# Patient Record
Sex: MALE | Race: WHITE | NOT HISPANIC OR LATINO | Employment: UNEMPLOYED | URBAN - METROPOLITAN AREA
[De-identification: names, ages, dates, MRNs, and addresses within clinical notes are randomized per-mention and may not be internally consistent; named-entity substitution may affect disease eponyms.]

---

## 2017-01-04 ENCOUNTER — OFFICE VISIT (OUTPATIENT)
Dept: FAMILY MEDICINE CLINIC | Facility: CLINIC | Age: 72
End: 2017-01-04

## 2017-01-04 VITALS
SYSTOLIC BLOOD PRESSURE: 140 MMHG | HEART RATE: 86 BPM | HEIGHT: 73 IN | DIASTOLIC BLOOD PRESSURE: 82 MMHG | OXYGEN SATURATION: 95 % | TEMPERATURE: 98.8 F | BODY MASS INDEX: 33.27 KG/M2 | WEIGHT: 251 LBS

## 2017-01-04 DIAGNOSIS — J06.9 ACUTE URI: Primary | ICD-10-CM

## 2017-01-04 DIAGNOSIS — E11.9 TYPE 2 DIABETES MELLITUS WITHOUT COMPLICATION, WITHOUT LONG-TERM CURRENT USE OF INSULIN (HCC): ICD-10-CM

## 2017-01-04 DIAGNOSIS — I10 ESSENTIAL HYPERTENSION: ICD-10-CM

## 2017-01-04 PROCEDURE — 99213 OFFICE O/P EST LOW 20 MIN: CPT | Performed by: NURSE PRACTITIONER

## 2017-01-04 RX ORDER — AZITHROMYCIN 250 MG/1
TABLET, FILM COATED ORAL
Qty: 6 TABLET | Refills: 0 | Status: SHIPPED | OUTPATIENT
Start: 2017-01-04 | End: 2017-05-02

## 2017-01-04 NOTE — MR AVS SNAPSHOT
Tyrone Shipman   1/4/2017 10:20 AM   Office Visit    Dept Phone:  [phone]   Encounter #:  [id number]    Provider:  YANIRA Durham   Department:  [facility]                Your Full Care Plan              Today's Medication Changes          These changes are accurate as of: 1/4/17 10:54 AM.  If you have any questions, ask your nurse or doctor.               New Medication(s)Ordered:     azithromycin 250 MG tablet   Commonly known as:  ZITHROMAX   Take 2 tablets the first day, then 1 tablet daily for 4 days.            Where to Get Your Medications      These medications were sent to [address] [facility] AT [facility] [phone] [facility] [phone] [address], [place] [address]     Phone:  [phone]     azithromycin 250 MG tablet    metFORMIN 500 MG tablet    metoprolol tartrate 25 MG tablet                  Your Updated Medication List          This list is accurate as of: 1/4/17 10:54 AM.  Always use your most recent med list.                azithromycin 250 MG tablet   Commonly known as:  ZITHROMAX   Take 2 tablets the first day, then 1 tablet daily for 4 days.       lisinopril 5 MG tablet   Commonly known as:  PRINIVIL,ZESTRIL   Take 1 tablet by mouth daily.       metFORMIN 500 MG tablet   Commonly known as:  GLUCOPHAGE   Take 1 tablet by mouth 2 (Two) Times a Day With Meals.       metoprolol tartrate 25 MG tablet   Commonly known as:  LOPRESSOR   Take 1 tablet by mouth 2 (Two) Times a Day.               You Were Diagnosed With        Codes Comments    Acute URI    -  Primary ICD-10-CM: J06.9  ICD-9-CM: 465.9     Type 2 diabetes mellitus without complication, without long-term current use of insulin     ICD-10-CM: E11.9  ICD-9-CM: 250.00     Essential hypertension     ICD-10-CM: I10  ICD-9-CM: 401.9       Instructions    Refilled rx metoprolol 25mg daily  and metformin 500mg daily.    Start zpack, take as directed.   May take mucinex DM OTC as needed for cough.  Increase fluid intake, get plenty of rest.   Patient to schedule appt with Dr. Luong for check up and bloodwork.   Patient agrees with plan of care and understands instructions. Call if worsening symptoms or any problems or concerns.        Patient Instructions History      Upcoming Appointments     Visit Type Date Time Department    OFFICE VISIT 2017 10:20 AM [facility]  [employer] [facility]    OFFICE VISIT 2017  9:20 AM [facility]      [employer] Sign[facility]     [facility] [employer] allows you to send messages to your doctor, view your test results, renew your prescriptions, schedule appointments, and more. To sign up, go to [employer] and click on the Sign Up Now link in the New User? box. Enter your [employer] Activation Code exactly as it appears below along with the last four digits of your Social Security Number and your Date of Birth () to complete the sign-up process. If you do not sign up before the expiration date, you must request a new code.    [employer] Activation Code: [email or web address]  Expires: 2017 10:54 AM    If you have questions, you can email [email or web address] or call [phone] to talk to our [employer] staff. Remember, [employer] is NOT to be used for urgent needs. For medical emergencies, dial 911.               Other Info from Your Visit           Your Appointments     2017  9:20 AM EST   Office Visit with Manfred Luong MD   [facility] (--)    [address] [place] [id number] [phone]           Arrive 15 minutes prior to appointment.              Allergies     Sulfa Antibiotics        Reason for Visit     URI sore throat, chest congestion,       Vital Signs     Blood Pressure Pulse Temperature Height Weight Oxygen Saturation    140/82 (BP Location: Left arm, Patient Position: Sitting, Cuff Size: Large  "Adult) 86 98.8 °F (37.1 °C) (Oral) 73\" (185.4 cm) 251 lb (114 kg) 95%    Body Mass Index Smoking Status                33.12 kg/m2 Never Smoker          Problems and Diagnoses Noted     High blood pressure    Type 2 diabetes mellitus without complication    Acute upper respiratory infection    -  Primary        "

## 2017-01-04 NOTE — PROGRESS NOTES
Chely Shipman is a 71 y.o. male.     History of Present Illness   C/o sore throat and chest congestion, started about 1 month ago. He had an abx, states he took care of himself, he states his building is cold. 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 He states he had an antibiotic at home, he thinks he got better but not totally better.  He took amoxicillin at home 2x day for 4 days.  He states it helped but he didn't have enough of the medication. He denies fever.  He does have a cough, non productive.  He states he wheezes at night.  He denies nasal drainage and sinus congestion.    The following portions of the patient's history were reviewed and updated as appropriate: allergies, current medications, past family history, past medical history, past social history, past surgical history and problem list.    Review of Systems   Constitutional: Negative for chills and fever.   HENT: Positive for congestion and sore throat. Negative for ear pain, rhinorrhea and sinus pressure.    Eyes: Negative for pain.   Respiratory: Positive for cough and wheezing. Negative for chest tightness and shortness of breath.    Cardiovascular: Negative for chest pain.   Musculoskeletal: Negative for arthralgias and myalgias.   Skin: Negative for pallor.   Allergic/Immunologic: Negative for environmental allergies.   Neurological: Negative for dizziness, light-headedness and headaches.   All other systems reviewed and are negative.      Objective   Physical Exam   Constitutional: He is oriented to person, place, and time. He appears well-developed and well-nourished.   HENT:   Head: Normocephalic.   Right Ear: Tympanic membrane and ear canal normal.   Left Ear: Tympanic membrane and ear canal normal.   Nose: Nose normal.   Mouth/Throat: Uvula is midline and mucous membranes are normal. Posterior oropharyngeal edema present.   Eyes: Pupils are equal, round, and  reactive to light.   Neck: Neck supple.   Cardiovascular: Normal rate, regular rhythm and normal heart sounds.    Pulmonary/Chest: Effort normal and breath sounds normal. No respiratory distress. He has no wheezes.   Lymphadenopathy:     He has no cervical adenopathy.   Neurological: He is alert and oriented to person, place, and time.   Skin: Skin is warm and dry.   Psychiatric: He has a normal mood and affect. His behavior is normal. Judgment and thought content normal.   Nursing note and vitals reviewed.      Assessment/Plan   Tyrone was seen today for uri.    Diagnoses and all orders for this visit:    Acute URI    Type 2 diabetes mellitus without complication, without long-term current use of insulin    Essential hypertension    Other orders  -     Discontinue: metFORMIN (GLUCOPHAGE) 500 MG tablet; Take 1 tablet by mouth 2 (Two) Times a Day With Meals.  -     metoprolol tartrate (LOPRESSOR) 25 MG tablet; Take 1 tablet by mouth 2 (Two) Times a Day.  -     azithromycin (ZITHROMAX) 250 MG tablet; Take 2 tablets the first day, then 1 tablet daily for 4 days.  -     metFORMIN (GLUCOPHAGE) 500 MG tablet; Take 1 tablet by mouth 2 (Two) Times a Day With Meals.        Refilled rx metoprolol 25mg daily and metformin 500mg daily.    Start zpack, take as directed.   May take mucinex DM OTC as needed for cough.  Increase fluid intake, get plenty of rest.   Patient to schedule appt with Dr. Luong for check up and bloodwork.   Patient agrees with plan of care and understands instructions. Call if worsening symptoms or any problems or concerns.

## 2017-01-04 NOTE — PATIENT INSTRUCTIONS
Refilled rx metoprolol 25mg daily and metformin 500mg daily.    Start zpack, take as directed.   May take mucinex DM OTC as needed for cough.  Increase fluid intake, get plenty of rest.   Patient to schedule appt with Dr. Luong for check up and bloodwork.   Patient agrees with plan of care and understands instructions. Call if worsening symptoms or any problems or concerns.

## 2017-04-24 ENCOUNTER — TELEPHONE (OUTPATIENT)
Dept: FAMILY MEDICINE CLINIC | Facility: CLINIC | Age: 72
End: 2017-04-24

## 2017-04-24 NOTE — TELEPHONE ENCOUNTER
----- Message from Manfred Luong MD sent at 4/24/2017  4:38 PM EDT -----  Contact: PATIENT [phone]  Okay but make an appointment if you have not had lab work in the last 3 months.  ----- Message -----     From: Gisell Quick MA     Sent: 4/24/2017   4:10 PM       To: Manfred Luong MD        ----- Message -----     From: Vy Espinoza     Sent: 4/24/2017   3:44 PM       To: iGsell Quick MA    REFILL ON metoprolol tartrate (LOPRESSOR) 25 MG tablet   Take 1 tablet by mouth 2 (Two) Times a Day    PATIENT HAS BEEN OUT FOR A WEEK.    [facility] [address] [address] AT [facility] & ([facility] - [phone] [facility] [phone] (Phone)     Spoke with pt's daughter made lab appt

## 2017-04-26 PROCEDURE — 80061 LIPID PANEL: CPT | Performed by: FAMILY MEDICINE

## 2017-04-26 PROCEDURE — 83036 HEMOGLOBIN GLYCOSYLATED A1C: CPT | Performed by: FAMILY MEDICINE

## 2017-04-26 PROCEDURE — 80053 COMPREHEN METABOLIC PANEL: CPT | Performed by: FAMILY MEDICINE

## 2017-05-02 ENCOUNTER — OFFICE VISIT (OUTPATIENT)
Dept: FAMILY MEDICINE CLINIC | Facility: CLINIC | Age: 72
End: 2017-05-02

## 2017-05-02 VITALS
TEMPERATURE: 97.9 F | DIASTOLIC BLOOD PRESSURE: 62 MMHG | BODY MASS INDEX: 34.99 KG/M2 | HEIGHT: 73 IN | WEIGHT: 264 LBS | HEART RATE: 72 BPM | OXYGEN SATURATION: 94 % | SYSTOLIC BLOOD PRESSURE: 158 MMHG

## 2017-05-02 DIAGNOSIS — I10 ESSENTIAL HYPERTENSION: ICD-10-CM

## 2017-05-02 DIAGNOSIS — E11.9 TYPE 2 DIABETES MELLITUS WITHOUT COMPLICATION, WITHOUT LONG-TERM CURRENT USE OF INSULIN (HCC): Primary | ICD-10-CM

## 2017-05-02 DIAGNOSIS — L98.9 SKIN LESIONS: ICD-10-CM

## 2017-05-02 DIAGNOSIS — E66.9 OBESITY (BMI 30.0-34.9): ICD-10-CM

## 2017-05-02 PROCEDURE — 99214 OFFICE O/P EST MOD 30 MIN: CPT | Performed by: FAMILY MEDICINE

## 2017-07-14 ENCOUNTER — TELEPHONE (OUTPATIENT)
Dept: FAMILY MEDICINE CLINIC | Facility: CLINIC | Age: 72
End: 2017-07-14

## 2017-07-14 NOTE — TELEPHONE ENCOUNTER
Called and left message for pt to seek medical care over weekend if worsens or he does not feel he wait until Monday for treatment

## 2018-02-21 ENCOUNTER — OFFICE VISIT (OUTPATIENT)
Dept: FAMILY MEDICINE CLINIC | Facility: CLINIC | Age: 73
End: 2018-02-21

## 2018-02-21 VITALS
WEIGHT: 260 LBS | OXYGEN SATURATION: 97 % | BODY MASS INDEX: 34.46 KG/M2 | SYSTOLIC BLOOD PRESSURE: 180 MMHG | TEMPERATURE: 97.6 F | HEART RATE: 74 BPM | DIASTOLIC BLOOD PRESSURE: 90 MMHG | RESPIRATION RATE: 20 BRPM | HEIGHT: 73 IN

## 2018-02-21 DIAGNOSIS — I10 ESSENTIAL HYPERTENSION: ICD-10-CM

## 2018-02-21 DIAGNOSIS — E11.9 TYPE 2 DIABETES MELLITUS WITHOUT COMPLICATION, WITHOUT LONG-TERM CURRENT USE OF INSULIN (HCC): Primary | ICD-10-CM

## 2018-02-21 DIAGNOSIS — H93.A9 PULSATILE TINNITUS: ICD-10-CM

## 2018-02-21 LAB
ALBUMIN SERPL-MCNC: 4.1 G/DL (ref 3.5–5.2)
ALBUMIN UR-MCNC: 50 MG/L (ref 0–20)
ALBUMIN/GLOB SERPL: 1.4 G/DL
ALP SERPL-CCNC: 68 U/L (ref 39–117)
ALT SERPL W P-5'-P-CCNC: 29 U/L (ref 1–41)
ANION GAP SERPL CALCULATED.3IONS-SCNC: 8.9 MMOL/L
AST SERPL-CCNC: 20 U/L (ref 1–40)
BILIRUB SERPL-MCNC: 0.4 MG/DL (ref 0.1–1.2)
BUN BLD-MCNC: 14 MG/DL (ref 8–23)
BUN/CREAT SERPL: 16.3 (ref 7–25)
CALCIUM SPEC-SCNC: 9.5 MG/DL (ref 8.6–10.5)
CHLORIDE SERPL-SCNC: 103 MMOL/L (ref 98–107)
CHOLEST SERPL-MCNC: 182 MG/DL (ref 0–200)
CO2 SERPL-SCNC: 29.1 MMOL/L (ref 22–29)
CREAT BLD-MCNC: 0.86 MG/DL (ref 0.76–1.27)
GFR SERPL CREATININE-BSD FRML MDRD: 87 ML/MIN/1.73
GLOBULIN UR ELPH-MCNC: 3 GM/DL
GLUCOSE BLD-MCNC: 148 MG/DL (ref 65–99)
HBA1C MFR BLD: 8.45 % (ref 4.8–5.6)
HDLC SERPL-MCNC: 30 MG/DL (ref 40–60)
LDLC SERPL CALC-MCNC: 118 MG/DL (ref 0–100)
LDLC/HDLC SERPL: 3.95 {RATIO}
POTASSIUM BLD-SCNC: 4.7 MMOL/L (ref 3.5–5.2)
PROT SERPL-MCNC: 7.1 G/DL (ref 6–8.5)
SODIUM BLD-SCNC: 141 MMOL/L (ref 136–145)
TRIGL SERPL-MCNC: 168 MG/DL (ref 0–150)
VLDLC SERPL-MCNC: 33.6 MG/DL (ref 5–40)

## 2018-02-21 PROCEDURE — G0009 ADMIN PNEUMOCOCCAL VACCINE: HCPCS | Performed by: FAMILY MEDICINE

## 2018-02-21 PROCEDURE — 90670 PCV13 VACCINE IM: CPT | Performed by: FAMILY MEDICINE

## 2018-02-21 PROCEDURE — 80061 LIPID PANEL: CPT | Performed by: FAMILY MEDICINE

## 2018-02-21 PROCEDURE — 80053 COMPREHEN METABOLIC PANEL: CPT | Performed by: FAMILY MEDICINE

## 2018-02-21 PROCEDURE — 99214 OFFICE O/P EST MOD 30 MIN: CPT | Performed by: FAMILY MEDICINE

## 2018-02-21 PROCEDURE — 83036 HEMOGLOBIN GLYCOSYLATED A1C: CPT | Performed by: FAMILY MEDICINE

## 2018-02-21 PROCEDURE — 36415 COLL VENOUS BLD VENIPUNCTURE: CPT | Performed by: FAMILY MEDICINE

## 2018-02-21 PROCEDURE — 82043 UR ALBUMIN QUANTITATIVE: CPT | Performed by: FAMILY MEDICINE

## 2018-02-21 RX ORDER — ASPIRIN 325 MG
325 TABLET ORAL DAILY
COMMUNITY
End: 2019-10-29

## 2018-02-21 RX ORDER — LISINOPRIL 10 MG/1
10 TABLET ORAL DAILY
Qty: 90 TABLET | Refills: 3 | Status: SHIPPED | OUTPATIENT
Start: 2018-02-21 | End: 2018-04-16 | Stop reason: SDUPTHER

## 2018-02-21 NOTE — PROGRESS NOTES
SUBJECTIVE:  The patient is a 72-year-old white male is here for follow-up on his diabetes.  He doesn't check his blood sugar daily but states his last time taken was 120.  He complains of hearing his heart beat when he lies on right side of his head.  His blood pressure today initially is elevated at 180/90.  Last year he stopped lisinopril because he felt it wasn't doing much good.    PAST MEDICAL HISTORY:  Reviewed.    REVIEW OF SYSTEMS:  Please see above; 14 point ROS otherwise negative.      OBJECTIVE: Vitals signs are reviewed and are stable.    HEENT: PERRLA.  []Discs sharp.  TMs clear area canals clear.  Throat and oral pharynx clear.  Dentition normal.  Neck:  Supple.  [] No bruits or masses.  Lungs:  Clear.  Breath sounds present bilaterally  Heart:  Regular rate and rhythm.  []No murmur gallop or rub  Abdomen:   Soft, nontender.  []Bowel sounds present  Extremities:  No cyanosis, clubbing or edema.  [] Range of motion.  Neurovascular status intact.    ASSESSMENT:    []Type 2 diabetes  Hypertension-uncontrolled  Pulsatile tinnitus  Obesity    PLAN:  []I explained to him lisinopril has to purposes.  It protects his kidneys and helps his blood pressure.  He's agreeing to restart this medication.  He will start at 10 mg in addition to his metoprolol 25 mg.  He will monitor his blood pressure and let me know what it's running next week.  CMP fasting lipids and hemoglobin A1c and CBC are ordered.  Urine for microalbuminuria is ordered.  2000-calorie diet is given.  He is encouraged exercise.  I told him the pulsatile sensation could be related to his blood pressure and should go away once this returns to normal.  If this does not happen he will let me know.  I will refer him to an ophthalmologist for diabetic eye exam.  He will follow up on his labs.  I encouraged compliance and routine visits.  I recommended one baby aspirin daily with food.

## 2018-04-16 ENCOUNTER — TELEPHONE (OUTPATIENT)
Dept: FAMILY MEDICINE CLINIC | Facility: CLINIC | Age: 73
End: 2018-04-16

## 2018-04-16 RX ORDER — LISINOPRIL 10 MG/1
20 TABLET ORAL DAILY
Qty: 90 TABLET | Refills: 3 | Status: SHIPPED | OUTPATIENT
Start: 2018-04-16 | End: 2019-10-29

## 2018-04-16 NOTE — TELEPHONE ENCOUNTER
Patient gave me permission to speak to daughter Sabrina he should be taking Lisinopril 10 daily Metoprolol 25 daily and Metformin 1000 and follow up in office next month.

## 2018-04-16 NOTE — TELEPHONE ENCOUNTER
"Make sure we have hippa covered before discussing her father.  If we have been her Father has been very noncompliant over the years about taking the medicine I prescribed.  He stated lisinopril \"made him feel funny.\"  He does not like to take the metformin as I prescribed.  This is not a new situation with him.  I will be glad to refill his medication and have always wanted him to take it appropriately and is I've advised.  He is not compliant about keeping his appointments on a regular basis as well.  Each time I see him I encouraged appropriate follow-up.  "

## 2018-04-16 NOTE — TELEPHONE ENCOUNTER
PT'S DAUGHTER CALLED STATING HER FATHER HAD BEEN SEEN BY DR HILARIO 5 OR 6 WEEKS AGO AND SHE IS JUST NOW FINDING OUT.   DAUGHTER STATES THAT SHE ONLY KNOWS NOW BECAUSE HER FATHER STATED THAT DR HILARIO TALKED ABOUT UPPING HIS LISINOPRIL FROM 10 MG TO 20 Mg, BUT CALLED IN 10 Mg. SO PATIENT HAS BEEN TAKING 2 PILLS A DAY AND IS NOW OUT OF RX ALL TOGETHER.  DAUGHTER ALSO STATED THAT HE HAS NOT BEEN TAKING METFORMIN AND FOUND OUT THAT HAS BEEN GOING ON FOR A WHILE. DAUGHTER IS UNSURE AS TO WHY HE IS NOT ON THAT RX AT ALL, AND IF HE SHOULD Be, HE NEEDS MORE CALLED INTO THE PHARMACY.  DAUGHTER REQUESTS A CALL BACK SO SHE CAN UNDERSTAND MORE ON WHAT IS GOING On.

## 2018-06-14 ENCOUNTER — TELEPHONE (OUTPATIENT)
Dept: FAMILY MEDICINE CLINIC | Facility: CLINIC | Age: 73
End: 2018-06-14

## 2018-06-14 NOTE — TELEPHONE ENCOUNTER
PATIENT THINKS HE HAS A BLOCKAGE IN HIS NECK. PATIENT CAN FEEL A HEARTBEAT AND HAVING TROUBLE SLEEPING ON THAT SIDE. PATIENT WANTS TO KNOW IF HE CAN GET A REFERRAL FOR A SPECIALIST

## 2018-06-14 NOTE — TELEPHONE ENCOUNTER
Apt made for Monday offered today but they wanted Monday, advised to go to ER if sx worsen over weekend

## 2018-06-27 ENCOUNTER — TELEPHONE (OUTPATIENT)
Dept: FAMILY MEDICINE CLINIC | Facility: CLINIC | Age: 73
End: 2018-06-27

## 2018-06-27 NOTE — TELEPHONE ENCOUNTER
Needs appt any day around 10:00. Patient had apt last week but missed it. He needed to get in for clogged artery. He was told he needed to be seen here first.

## 2019-06-18 ENCOUNTER — TELEPHONE (OUTPATIENT)
Dept: FAMILY MEDICINE CLINIC | Facility: CLINIC | Age: 74
End: 2019-06-18

## 2019-09-26 ENCOUNTER — TELEPHONE (OUTPATIENT)
Dept: FAMILY MEDICINE CLINIC | Facility: CLINIC | Age: 74
End: 2019-09-26

## 2019-10-29 ENCOUNTER — OFFICE VISIT (OUTPATIENT)
Dept: FAMILY MEDICINE CLINIC | Facility: CLINIC | Age: 74
End: 2019-10-29

## 2019-10-29 ENCOUNTER — HOSPITAL ENCOUNTER (OUTPATIENT)
Dept: CARDIOLOGY | Facility: HOSPITAL | Age: 74
Discharge: HOME | End: 2019-10-29
Admitting: INTERNAL MEDICINE

## 2019-10-29 VITALS
BODY MASS INDEX: 34.72 KG/M2 | DIASTOLIC BLOOD PRESSURE: 93 MMHG | WEIGHT: 262 LBS | OXYGEN SATURATION: 94 % | HEART RATE: 72 BPM | SYSTOLIC BLOOD PRESSURE: 197 MMHG | HEIGHT: 73 IN

## 2019-10-29 VITALS
OXYGEN SATURATION: 96 % | BODY MASS INDEX: 34.59 KG/M2 | SYSTOLIC BLOOD PRESSURE: 140 MMHG | HEART RATE: 71 BPM | DIASTOLIC BLOOD PRESSURE: 80 MMHG | HEIGHT: 73 IN | WEIGHT: 261 LBS | TEMPERATURE: 98 F | RESPIRATION RATE: 20 BRPM

## 2019-10-29 DIAGNOSIS — R07.9 CHEST PAIN, UNSPECIFIED TYPE: ICD-10-CM

## 2019-10-29 DIAGNOSIS — E11.9 TYPE 2 DIABETES MELLITUS WITHOUT COMPLICATION, WITHOUT LONG-TERM CURRENT USE OF INSULIN (HCC): ICD-10-CM

## 2019-10-29 DIAGNOSIS — R05.9 COUGH: ICD-10-CM

## 2019-10-29 DIAGNOSIS — R07.9 EXERTIONAL CHEST PAIN: ICD-10-CM

## 2019-10-29 DIAGNOSIS — R06.02 SHORTNESS OF BREATH: ICD-10-CM

## 2019-10-29 DIAGNOSIS — I10 ESSENTIAL HYPERTENSION: ICD-10-CM

## 2019-10-29 DIAGNOSIS — Z00.00 MEDICARE ANNUAL WELLNESS VISIT, INITIAL: Primary | ICD-10-CM

## 2019-10-29 DIAGNOSIS — R94.31 ABNORMAL EKG: ICD-10-CM

## 2019-10-29 DIAGNOSIS — R06.2 WHEEZING: ICD-10-CM

## 2019-10-29 DIAGNOSIS — I25.709 CORONARY ARTERY DISEASE INVOLVING CORONARY BYPASS GRAFT OF NATIVE HEART WITH ANGINA PECTORIS (HCC): Primary | ICD-10-CM

## 2019-10-29 LAB
ALBUMIN SERPL-MCNC: 4.2 G/DL (ref 3.5–5.2)
ALBUMIN/GLOB SERPL: 1.4 G/DL
ALP SERPL-CCNC: 72 U/L (ref 39–117)
ALT SERPL W P-5'-P-CCNC: 20 U/L (ref 1–41)
ANION GAP SERPL CALCULATED.3IONS-SCNC: 10.4 MMOL/L (ref 5–15)
AST SERPL-CCNC: 17 U/L (ref 1–40)
BASOPHILS # BLD AUTO: 0.03 10*3/MM3 (ref 0–0.2)
BASOPHILS NFR BLD AUTO: 0.5 % (ref 0–1.5)
BILIRUB SERPL-MCNC: 0.3 MG/DL (ref 0.2–1.2)
BUN BLD-MCNC: 14 MG/DL (ref 8–23)
BUN/CREAT SERPL: 15.1 (ref 7–25)
CALCIUM SPEC-SCNC: 9.1 MG/DL (ref 8.6–10.5)
CHLORIDE SERPL-SCNC: 103 MMOL/L (ref 98–107)
CO2 SERPL-SCNC: 26.6 MMOL/L (ref 22–29)
CREAT BLD-MCNC: 0.93 MG/DL (ref 0.76–1.27)
D DIMER PPP FEU-MCNC: 0.34 MCGFEU/ML (ref 0–0.49)
DEPRECATED RDW RBC AUTO: 43.6 FL (ref 37–54)
EOSINOPHIL # BLD AUTO: 0.1 10*3/MM3 (ref 0–0.4)
EOSINOPHIL NFR BLD AUTO: 1.7 % (ref 0.3–6.2)
ERYTHROCYTE [DISTWIDTH] IN BLOOD BY AUTOMATED COUNT: 12.9 % (ref 12.3–15.4)
GFR SERPL CREATININE-BSD FRML MDRD: 79 ML/MIN/1.73
GLOBULIN UR ELPH-MCNC: 3 GM/DL
GLUCOSE BLD-MCNC: 198 MG/DL (ref 65–99)
HCT VFR BLD AUTO: 46.1 % (ref 37.5–51)
HGB BLD-MCNC: 15.6 G/DL (ref 13–17.7)
LYMPHOCYTES # BLD AUTO: 2.07 10*3/MM3 (ref 0.7–3.1)
LYMPHOCYTES NFR BLD AUTO: 36.2 % (ref 19.6–45.3)
MCH RBC QN AUTO: 31.5 PG (ref 26.6–33)
MCHC RBC AUTO-ENTMCNC: 33.8 G/DL (ref 31.5–35.7)
MCV RBC AUTO: 92.9 FL (ref 79–97)
MONOCYTES # BLD AUTO: 0.48 10*3/MM3 (ref 0.1–0.9)
MONOCYTES NFR BLD AUTO: 8.4 % (ref 5–12)
NEUTROPHILS # BLD AUTO: 3.01 10*3/MM3 (ref 1.7–7)
NEUTROPHILS NFR BLD AUTO: 52.7 % (ref 42.7–76)
NT-PROBNP SERPL-MCNC: 125.5 PG/ML (ref 5–900)
PLATELET # BLD AUTO: 128 10*3/MM3 (ref 140–450)
PMV BLD AUTO: 11.3 FL (ref 6–12)
POTASSIUM BLD-SCNC: 4.8 MMOL/L (ref 3.5–5.2)
PROT SERPL-MCNC: 7.2 G/DL (ref 6–8.5)
RBC # BLD AUTO: 4.96 10*6/MM3 (ref 4.14–5.8)
SODIUM BLD-SCNC: 140 MMOL/L (ref 136–145)
TROPONIN T SERPL-MCNC: <0.01 NG/ML (ref 0–0.03)
WBC NRBC COR # BLD: 5.72 10*3/MM3 (ref 3.4–10.8)

## 2019-10-29 PROCEDURE — 80053 COMPREHEN METABOLIC PANEL: CPT | Performed by: INTERNAL MEDICINE

## 2019-10-29 PROCEDURE — 90732 PPSV23 VACC 2 YRS+ SUBQ/IM: CPT | Performed by: FAMILY MEDICINE

## 2019-10-29 PROCEDURE — 80061 LIPID PANEL: CPT | Performed by: FAMILY MEDICINE

## 2019-10-29 PROCEDURE — 85379 FIBRIN DEGRADATION QUANT: CPT | Performed by: INTERNAL MEDICINE

## 2019-10-29 PROCEDURE — 93000 ELECTROCARDIOGRAM COMPLETE: CPT | Performed by: FAMILY MEDICINE

## 2019-10-29 PROCEDURE — 99204 OFFICE O/P NEW MOD 45 MIN: CPT | Performed by: INTERNAL MEDICINE

## 2019-10-29 PROCEDURE — 94760 N-INVAS EAR/PLS OXIMETRY 1: CPT

## 2019-10-29 PROCEDURE — 83036 HEMOGLOBIN GLYCOSYLATED A1C: CPT | Performed by: FAMILY MEDICINE

## 2019-10-29 PROCEDURE — G0438 PPPS, INITIAL VISIT: HCPCS | Performed by: FAMILY MEDICINE

## 2019-10-29 PROCEDURE — G0009 ADMIN PNEUMOCOCCAL VACCINE: HCPCS | Performed by: FAMILY MEDICINE

## 2019-10-29 PROCEDURE — 85025 COMPLETE CBC W/AUTO DIFF WBC: CPT | Performed by: INTERNAL MEDICINE

## 2019-10-29 PROCEDURE — 84484 ASSAY OF TROPONIN QUANT: CPT | Performed by: INTERNAL MEDICINE

## 2019-10-29 PROCEDURE — 36415 COLL VENOUS BLD VENIPUNCTURE: CPT

## 2019-10-29 PROCEDURE — 83880 ASSAY OF NATRIURETIC PEPTIDE: CPT | Performed by: INTERNAL MEDICINE

## 2019-10-29 PROCEDURE — 80053 COMPREHEN METABOLIC PANEL: CPT | Performed by: FAMILY MEDICINE

## 2019-10-29 PROCEDURE — 99214 OFFICE O/P EST MOD 30 MIN: CPT | Performed by: FAMILY MEDICINE

## 2019-10-29 RX ORDER — NITROGLYCERIN 0.4 MG/1
0.4 TABLET SUBLINGUAL
Status: SHIPPED | OUTPATIENT
Start: 2019-10-29

## 2019-10-29 RX ORDER — SODIUM CHLORIDE 0.9 % (FLUSH) 0.9 %
10 SYRINGE (ML) INJECTION AS NEEDED
Status: SHIPPED | OUTPATIENT
Start: 2019-10-29

## 2019-10-29 NOTE — PROGRESS NOTES
The ABCs of the Annual Wellness Visit  Initial Medicare Wellness Visit    Chief Complaint   Patient presents with   • Medicare Wellness-Initial Visit   • Diabetes   • Hypertension   • Cough     worse in morning occas wheezing       Subjective   History of Present Illness:  Tyrnoe Shipman is a 74 y.o. male who presents for an Initial Medicare Wellness Visit.    HEALTH RISK ASSESSMENT    Recent Hospitalizations:  No hospitalization(s) within the last year.    Current Medical Providers:  Patient Care Team:  Manfred Luong MD as PCP - General (Family Medicine)  Manfred Luong MD as PCP - Claims Attributed    Smoking Status:  Social History     Tobacco Use   Smoking Status Never Smoker       Alcohol Consumption:  Social History     Substance and Sexual Activity   Alcohol Use Yes    Comment: rare       Depression Screen:   PHQ-2/PHQ-9 Depression Screening 10/29/2019   Little interest or pleasure in doing things 0   Feeling down, depressed, or hopeless 0   Trouble falling or staying asleep, or sleeping too much 0   Feeling tired or having little energy 0   Poor appetite or overeating 0   Feeling bad about yourself - or that you are a failure or have let yourself or your family down 0   Trouble concentrating on things, such as reading the newspaper or watching television 0   Moving or speaking so slowly that other people could have noticed. Or the opposite - being so fidgety or restless that you have been moving around a lot more than usual 0   Thoughts that you would be better off dead, or of hurting yourself in some way 0   Total Score 0   If you checked off any problems, how difficult have these problems made it for you to do your work, take care of things at home, or get along with other people? Not difficult at all       Fall Risk Screen:  STEADI Fall Risk Assessment was completed, and patient is at LOW risk for falls.Assessment completed on:10/29/2019    Health Habits and Functional and Cognitive  Screening:  Functional & Cognitive Status 10/29/2019   Do you have difficulty preparing food and eating? No   Do you have difficulty bathing yourself, getting dressed or grooming yourself? No   Do you have difficulty using the toilet? No   Do you have difficulty moving around from place to place? No   Do you have trouble with steps or getting out of a bed or a chair? No   Current Diet Diabetic Diet   Dental Exam Up to date   Eye Exam Not up to date   Exercise (times per week) 5 times per week   Current Exercise Activities Include Walking   Do you need help using the phone?  No   Are you deaf or do you have serious difficulty hearing?  No   Do you need help with transportation? No   Do you need help shopping? No   Do you need help preparing meals?  No   Do you need help with housework?  No   Do you need help with laundry? No   Do you need help taking your medications? No   Do you need help managing money? No   Do you ever drive or ride in a car without wearing a seat belt? No   Have you felt unusual stress, anger or loneliness in the last month? No   Who do you live with? Alone   If you need help, do you have trouble finding someone available to you? No   Have you been bothered in the last four weeks by sexual problems? No   Do you have difficulty concentrating, remembering or making decisions? No         Does the patient have evidence of cognitive impairment? no    Asprin use counseling:Does not need ASA (and currently is not on it)    Age-appropriate Screening Schedule:  Refer to the list below for future screening recommendations based on patient's age, sex and/or medical conditions. Orders for these recommended tests are listed in the plan section. The patient has been provided with a written plan.    Health Maintenance   Topic Date Due   • TDAP/TD VACCINES (1 - Tdap) 07/19/1964   • ZOSTER VACCINE (1 of 2) 07/19/1995   • DIABETIC FOOT EXAM  04/26/2016   • DIABETIC EYE EXAM  10/10/2017   • HEMOGLOBIN A1C   "08/21/2018   • URINE MICROALBUMIN  02/21/2019   • INFLUENZA VACCINE  08/01/2019   • COLONOSCOPY  05/02/2027   • PNEUMOCOCCAL VACCINES (65+ LOW/MEDIUM RISK)  Addressed          The following portions of the patient's history were reviewed and updated as appropriate: past family history and past medical history.    Outpatient Medications Prior to Visit   Medication Sig Dispense Refill   • metoprolol tartrate (LOPRESSOR) 25 MG tablet Take 1 tablet by mouth Daily. 90 tablet 0   • lisinopril (PRINIVIL,ZESTRIL) 10 MG tablet Take 2 tablets by mouth Daily. One PO daily for BP 90 tablet 3   • metFORMIN (GLUCOPHAGE) 1000 MG tablet Take 1 tablet by mouth Daily With Breakfast. 90 tablet 3   • aspirin 325 MG tablet Take 325 mg by mouth Daily. Every other day       No facility-administered medications prior to visit.        Patient Active Problem List   Diagnosis   • Type 2 diabetes mellitus without complication (CMS/[facility])   • Essential hypertension       Advanced Care Planning:  Patient does not have an advance directive - information provided to the patient today    Review of Systems    Compared to one year ago, the patient feels his physical health is the same.  Compared to one year ago, the patient feels his mental health is the same.    Reviewed chart for potential of high risk medication in the elderly: yes  Reviewed chart for potential of harmful drug interactions in the elderly:yes    Objective         Vitals:    10/29/19 0900   BP: 140/80   BP Location: Left arm   Patient Position: Sitting   Pulse: 71   Resp: 20   Temp: 98 °F (36.7 °C)   TempSrc: Oral   SpO2: 96%   Weight: 118 kg (261 lb)   Height: 185.4 cm (73\")   PainSc: 0-No pain       Body mass index is 34.43 kg/m².  Discussed the patient's BMI with him. The BMI is in the acceptable range.    Physical Exam          Assessment/Plan   Medicare Risks and Personalized Health Plan  CMS Preventative Services Quick Reference  Advance Directive Discussion  Fall " Risk  Immunizations Discussed/Encouraged (specific immunizations; Pneumococcal 23 )    The above risks/problems have been discussed with the patient.  Pertinent information has been shared with the patient in the After Visit Summary.  Follow up plans and orders are seen below in the Assessment/Plan Section.    Diagnoses and all orders for this visit:    1. Medicare annual wellness visit, initial (Primary)    2. Type 2 diabetes mellitus without complication, without long-term current use of insulin (CMS/[facility])  -     Cancel: CBC & Differential  -     Cancel: Comprehensive Metabolic Panel  -     Cancel: Hemoglobin A1c  -     Cancel: Lipid Panel  -     MicroAlbumin, Urine, Random - Urine, Clean Catch    3. Cough    4. Wheezing    5. Exertional chest pain  -     ECG 12 Lead    6. Abnormal EKG    Other orders  -     Pneumococcal Polysaccharide Vaccine 23-Valent Greater Than or Equal To 3yo Subcutaneous / IM      Follow Up:  No Follow-up on file.     An After Visit Summary and PPPS were given to the patient.

## 2019-10-29 NOTE — PROGRESS NOTES
SUBJECTIVE:  The patient is a 74-year-old white male.  He has hypertension and type 2 diabetes.  He has not been compliant with his routine visits.  He states his blood sugars are fairly well controlled with metformin.  Today he complains of intermittent exertional chest pain.  He is unsure of his family history regarding coronary artery disease.  He gets this when he walks.  This happened 2 2 days ago.  No diaphoresis or syncope.  He has no chest pain today.  He had no chest pain yesterday.  He complains of wheezing which he hears at night at times.  This causes him to cough.    PAST MEDICAL HISTORY:  Reviewed.    REVIEW OF SYSTEMS:  Please see above; all others reviewed and are negative.      OBJECTIVE:   Vitals signs are reviewed and are stable.    General:  Well-nourished.  Alert and oriented x3 in no acute distress.  HEENT: PERRLA.   Neck:  Supple.  Left carotid bruit noted  Lungs:  Clear.    Heart:  Regular rate and rhythm.   Abdomen:   Soft, nontender.   Extremities:  No cyanosis, clubbing or edema.   Neurological:  Grossly intact without motor or sensory deficits.     ASSESSMENT:    Medicare wellness  Exertional chest pain  Abnormal EKG  Cough  Left carotid bruit  Type 2 diabetes  Hypertension  Hyperlipidemia      ECG 12 Lead  Date/Time: 10/29/2019 9:27 AM  Performed by: Manfred Luong MD  Authorized by: Manfred Luong MD   Rhythm: sinus rhythm  Conduction: non-specific intraventricular conduction delay  ST Elevation: III, V1, V2 and V3  T inversion: I and aVL    Clinical impression: abnormal EKG        EKG is done here and interpreted by me.  None for comparison.  Indication exertional chest pain.  EKG shows sinus rhythm with right ventricular conduction delay.  T wave inversion present in aVL.  ST elevation present in anterior precordial leads in lead III.  PLAN: I will refer him to the chest pain clinic.  He will need work-up regarding his carotid bruit.  Lisinopril may be contributing to his cough  however I am not certain of this at this point in time    Dictated utilizing Dragon dictation..

## 2019-10-29 NOTE — PATIENT INSTRUCTIONS
Nuclear Medicine Exam    A nuclear medicine exam is a safe and painless imaging test. It helps your health care provider detect and diagnose disease in the body. It also provides information about the way your organs work and how they are structured.  For a nuclear medicine exam, you will be given a radioactive tracer. This substance is absorbed by your body's organs. A large scanning machine detects the tracer and creates pictures of the areas that your health care provider wants to look at.  There are several kinds of nuclear medicine exams. They include:  · CT scan.  · MRI.  · PET scan.  Tell a health care provider about:  · Any allergies you have.  · All medicines you are taking, including vitamins, herbs, eye drops, creams, and over-the-counter medicines.  · Any problems you or family members have had with anesthetic medicines.  · Any blood disorders you have.  · Any surgeries you have had.  · Any medical conditions you have.  · Whether you are pregnant or may be pregnant.  · Whether you are nursing.  What happens before the procedure?  · Ask your health care provider about changing or stopping your regular medicines.  · Follow instructions from your health care provider about eating or drinking restrictions.  · Do not wear jewelry.  · Wear loose, comfortable clothing. You may be asked to wear a hospital gown for the procedure.  · Bring previous imaging studies, such as X-rays, with you to the exam if they are available.  What happens during the procedure?  · An IV tube may be inserted into one of your veins.  · You will be asked to lie on a table or sit in a chair.  · You will be given the radioactive tracer. You may get:  ? A pill or liquid to swallow.  ? An injection.  ? Medicine through your IV tube.  ? A gas to inhale.  · A large scanning machine will be used to create images of your body. After the pictures are taken, you may have to wait so your health care provider can make sure that enough good images  were taken.  The procedure may vary among health care providers and hospitals.  What happens after the procedure?  · You may go right home after the procedure and return to your usual activities, unless told otherwise by your health care provider.  · Drink enough water to keep urine clear or pale yellow. This helps to flush the radioactive tracer out of your body.  · It is your responsibility to get your test results. Ask your health care provider or the department performing the test when your results will be ready.  · Seek immediate medical care if you have shortness of breath.  This information is not intended to replace advice given to you by your health care provider. Make sure you discuss any questions you have with your health care provider.  Document Released: 01/25/2006 Document Revised: 08/17/2017 Document Reviewed: 07/06/2016  Elsevier Interactive Patient Education © 2019 [employer]

## 2019-10-31 ENCOUNTER — HOSPITAL ENCOUNTER (OUTPATIENT)
Dept: CARDIOLOGY | Facility: HOSPITAL | Age: 74
Discharge: HOME | End: 2019-10-31
Admitting: INTERNAL MEDICINE

## 2019-10-31 ENCOUNTER — TELEPHONE (OUTPATIENT)
Dept: CARDIOLOGY | Facility: CLINIC | Age: 74
End: 2019-10-31

## 2019-10-31 VITALS — BODY MASS INDEX: 34.77 KG/M2 | HEIGHT: 73 IN | WEIGHT: 262.35 LBS

## 2019-10-31 DIAGNOSIS — R94.39 ABNORMAL NUCLEAR STRESS TEST: ICD-10-CM

## 2019-10-31 DIAGNOSIS — I20.9 ANGINA PECTORIS (HCC): Primary | ICD-10-CM

## 2019-10-31 LAB
BH CV NUCLEAR PRIOR STUDY: 3
BH CV STRESS BP STAGE 1: NORMAL
BH CV STRESS COMMENTS STAGE 1: NORMAL
BH CV STRESS DOSE REGADENOSON STAGE 1: 0.4
BH CV STRESS DURATION MIN STAGE 1: 0
BH CV STRESS DURATION SEC STAGE 1: 10
BH CV STRESS HR STAGE 1: 92
BH CV STRESS PROTOCOL 1: NORMAL
BH CV STRESS RECOVERY BP: NORMAL MMHG
BH CV STRESS RECOVERY HR: 87 BPM
BH CV STRESS STAGE 1: 1
LV EF NUC BP: 52 %
MAXIMAL PREDICTED HEART RATE: 146 BPM
PERCENT MAX PREDICTED HR: 63.01 %
STRESS BASELINE BP: NORMAL MMHG
STRESS BASELINE HR: 80 BPM
STRESS PERCENT HR: 74 %
STRESS POST EXERCISE DUR SEC: 10 SEC
STRESS POST PEAK BP: NORMAL MMHG
STRESS POST PEAK HR: 92 BPM
STRESS TARGET HR: 124 BPM

## 2019-10-31 PROCEDURE — 78492 MYOCRD IMG PET MLT RST&STRS: CPT | Performed by: INTERNAL MEDICINE

## 2019-10-31 PROCEDURE — 0 RUBIDIUM CHLORIDE: Performed by: INTERNAL MEDICINE

## 2019-10-31 PROCEDURE — A9555 RB82 RUBIDIUM: HCPCS | Performed by: INTERNAL MEDICINE

## 2019-10-31 PROCEDURE — 93005 ELECTROCARDIOGRAM TRACING: CPT | Performed by: INTERNAL MEDICINE

## 2019-10-31 PROCEDURE — 93017 CV STRESS TEST TRACING ONLY: CPT

## 2019-10-31 PROCEDURE — 78492 MYOCRD IMG PET MLT RST&STRS: CPT

## 2019-10-31 PROCEDURE — 93010 ELECTROCARDIOGRAM REPORT: CPT | Performed by: INTERNAL MEDICINE

## 2019-10-31 PROCEDURE — 93016 CV STRESS TEST SUPVJ ONLY: CPT | Performed by: INTERNAL MEDICINE

## 2019-10-31 PROCEDURE — 93018 CV STRESS TEST I&R ONLY: CPT | Performed by: INTERNAL MEDICINE

## 2019-10-31 PROCEDURE — 25010000002 REGADENOSON 0.4 MG/5ML SOLUTION: Performed by: INTERNAL MEDICINE

## 2019-10-31 RX ADMIN — RUBIDIUM CHLORIDE RB-82 1 DOSE: 150 INJECTION, SOLUTION INTRAVENOUS at 11:55

## 2019-10-31 RX ADMIN — RUBIDIUM CHLORIDE RB-82 1 DOSE: 150 INJECTION, SOLUTION INTRAVENOUS at 12:07

## 2019-10-31 RX ADMIN — REGADENOSON 0.4 MG: 0.08 INJECTION, SOLUTION INTRAVENOUS at 12:07

## 2019-10-31 NOTE — PROGRESS NOTES
"        [facility]      Patient Name: Tyrone Shipman  :1945  Age: 74 y.o.  Encounter Provider:  TINY CARDOOS      Chief Complaint:   Chief Complaint   Patient presents with   • Chest Pain     Sent from Dr. Luong, PCP-c/o chest pain and shortness of breath with minimal exertion, especially walking-all symptoms subside with rest.  Pt had CABG 10 years ago([facility])-pt denies feeling this way prior to that surgery.  Pt states 10 years ago his issue was a lot more severe.  Has been feeling Ok since then until recently. Pt denies any chest pain right now-has not folllowed up with cardiologist in years.  Pt thinkgs Dr. Luong is \"over-reacting\".         HPI  Tyrone Shipman is a 74 y.o. male past medical history of coronary artery disease status post CABG over 10 years ago at [facility] PCP for evaluation of dyspnea on exertion and chest pain.  Patient has not been following with a cardiologist.  He notes chronic dyspnea on exertion since surgery but states that with increased activity this is improved over the last 1 to 2 years.  However he does note exertional chest pressure that started last Thursday.  He states it only lasted for a few seconds to minutes and that he has had 2 more episodes.  He notes a substernal chest pressure that does not radiate.  No associated shortness of air nausea vomiting or diaphoresis.  He denies orthopnea PND or edema.  No dizziness or syncope.      The following portions of the patient's history were reviewed and updated as appropriate: allergies, current medications, past family history, past medical history, past social history, past surgical history and problem list.      Review of Systems   Constitution: Negative for chills and fever.   HENT: Negative for hoarse voice and sore throat.    Eyes: Negative for double vision and photophobia.   Cardiovascular: Positive for chest pain and dyspnea on exertion. Negative for leg swelling, near-syncope, " "orthopnea, palpitations, paroxysmal nocturnal dyspnea and syncope.   Respiratory: Negative for cough and wheezing.    Skin: Negative for poor wound healing and rash.   Musculoskeletal: Negative for arthritis and joint swelling.   Gastrointestinal: Negative for bloating, abdominal pain, hematemesis and hematochezia.   Neurological: Negative for dizziness and focal weakness.   Psychiatric/Behavioral: Negative for depression and suicidal ideas.       OBJECTIVE:   Vital Signs  Vitals:    10/29/19 1129   BP: (!) 197/93   Pulse: 72   SpO2: 94%     Estimated body mass index is 34.57 kg/m² as calculated from the following:    Height as of this encounter: 185.4 cm (73\").    Weight as of this encounter: 119 kg (262 lb).    Physical Exam   Constitutional: He is oriented to person, place, and time. He appears well-developed and well-nourished.   HENT:   Head: Normocephalic and atraumatic.   Eyes: Conjunctivae are normal. Pupils are equal, round, and reactive to light.   Neck: No JVD present. No thyromegaly present.   Cardiovascular: Exam reveals no gallop and no friction rub.   No murmur heard.  Pulmonary/Chest: No respiratory distress. He exhibits no tenderness.   Abdominal: Bowel sounds are normal. He exhibits no distension.   Musculoskeletal: He exhibits no edema or tenderness.   Neurological: He is alert and oriented to person, place, and time.   Skin: No rash noted. No erythema.   Psychiatric: He has a normal mood and affect. Judgment normal.   Vitals reviewed.        ECG 12 Lead  Date/Time: 10/31/2019 4:16 PM  Performed by: Mando Nava Jr., MD  Authorized by: Mando Nava Jr., MD   Comparison: not compared with previous ECG   Previous ECG: no previous ECG available  Rhythm: sinus rhythm  Q waves: V1 and V2    T inversion: aVL and I    Clinical impression: abnormal EKG  Clinical impression comment: Sinus rhythm, anteroseptal infarct age-indeterminate, T wave inversion consider lateral ischemia                "   ASSESSMENT:      Diagnosis Plan   1. Coronary artery disease involving coronary bypass graft of native heart with angina pectoris (CMS/[facility])     2. Chest pain, unspecified type  sodium chloride 0.9 % flush 10 mL    nitroglycerin (NITROSTAT) SL tablet 0.4 mg    CBC & Differential    Comprehensive Metabolic Panel    Troponin T    D-Dimer    proBNP    Troponin T    Troponin T    D-Dimer    D-Dimer    proBNP    proBNP    CBC Auto Differential    Manual Differential    Stress Test With Pet Myocardial Perfusion (MULTI STUDY, REST AND STRESS)   3. Shortness of breath  sodium chloride 0.9 % flush 10 mL    nitroglycerin (NITROSTAT) SL tablet 0.4 mg    CBC & Differential    Comprehensive Metabolic Panel    Troponin T    D-Dimer    proBNP    Troponin T    Troponin T    D-Dimer    D-Dimer    proBNP    proBNP    CBC Auto Differential    Manual Differential    Stress Test With Pet Myocardial Perfusion (MULTI STUDY, REST AND STRESS)   4. Type 2 diabetes mellitus without complication, without long-term current use of insulin (CMS/[facility])     5. Essential hypertension           PLAN OF CARE:     1. Chest pain -typical and atypical components to pain complaints.  We will schedule the patient for vasodilator nuclear stress study this week as cardiac enzymes are negative.  Continue aspirin and beta-blocker.  Not currently on statin will records from primary care, if no recent serological surveillance will check lipid panel.  2. Essential hypertension -check twice daily blood pressure log.  Sodium restricted diet.    We will discuss diagnostic testing results before suggesting further treatment recommendations.             Discharge Medications      Notice    This visit has been closed. A record of the med list at the time of the visit is not available.         Thank you for allowing me to participate in the care of your patient,      Sincerely,   Mando Nava Jr, MD  [facility]  10/29/2019

## 2019-10-31 NOTE — TELEPHONE ENCOUNTER
10/31 L heart cath bypass graft study was ordered for pt, he said this was unexpected, he goes to [facility] for his care,he wants to research our [facility] before scheduling, despite Dr MILAN expression how urgent this procedure needs to be performed.    Thank you    Sindhu CR

## 2019-10-31 NOTE — TELEPHONE ENCOUNTER
Left message to call the office to discuss abnormal stress study.  Discussed the case with Dr. Jackson earlier today and there is a concerning diagnostic testing consistent with moderate to large area of stress-induced ischemia.

## 2019-11-01 ENCOUNTER — TELEPHONE (OUTPATIENT)
Dept: CARDIOLOGY | Facility: CLINIC | Age: 74
End: 2019-11-01

## 2019-11-01 ENCOUNTER — HOSPITAL ENCOUNTER (OUTPATIENT)
Facility: HOSPITAL | Age: 74
Setting detail: HOSPITAL OUTPATIENT SURGERY
End: 2019-11-01
Attending: INTERNAL MEDICINE | Admitting: INTERNAL MEDICINE

## 2019-11-01 DIAGNOSIS — I20.9 ANGINA PECTORIS (HCC): ICD-10-CM

## 2019-11-01 DIAGNOSIS — R94.39 ABNORMAL NUCLEAR STRESS TEST: ICD-10-CM

## 2019-11-01 NOTE — TELEPHONE ENCOUNTER
11/1 Pt called today to inform us he is going with his Cardiologist to get a second opinion and does not want to schedule Cardiac Cath at this time.    Thank you    Sindhu CR